# Patient Record
Sex: FEMALE | Race: OTHER | HISPANIC OR LATINO | URBAN - METROPOLITAN AREA
[De-identification: names, ages, dates, MRNs, and addresses within clinical notes are randomized per-mention and may not be internally consistent; named-entity substitution may affect disease eponyms.]

---

## 2017-10-03 ENCOUNTER — EMERGENCY (EMERGENCY)
Facility: HOSPITAL | Age: 62
LOS: 1 days | Discharge: HOME | End: 2017-10-03
Attending: EMERGENCY MEDICINE
Payer: MEDICAID

## 2017-10-03 VITALS
WEIGHT: 115.08 LBS | HEIGHT: 59 IN | DIASTOLIC BLOOD PRESSURE: 94 MMHG | TEMPERATURE: 98 F | HEART RATE: 95 BPM | OXYGEN SATURATION: 99 % | SYSTOLIC BLOOD PRESSURE: 177 MMHG | RESPIRATION RATE: 16 BRPM

## 2017-10-03 VITALS
RESPIRATION RATE: 18 BRPM | OXYGEN SATURATION: 98 % | SYSTOLIC BLOOD PRESSURE: 152 MMHG | DIASTOLIC BLOOD PRESSURE: 84 MMHG | HEART RATE: 90 BPM

## 2017-10-03 PROCEDURE — 99284 EMERGENCY DEPT VISIT MOD MDM: CPT

## 2017-10-03 PROCEDURE — 70450 CT HEAD/BRAIN W/O DYE: CPT | Mod: 26

## 2017-10-03 PROCEDURE — 70450 CT HEAD/BRAIN W/O DYE: CPT

## 2017-10-03 PROCEDURE — 99284 EMERGENCY DEPT VISIT MOD MDM: CPT | Mod: 25

## 2017-10-03 RX ORDER — METOPROLOL TARTRATE 50 MG
25 TABLET ORAL ONCE
Qty: 0 | Refills: 0 | Status: COMPLETED | OUTPATIENT
Start: 2017-10-03 | End: 2017-10-03

## 2017-10-03 NOTE — ED PROVIDER NOTE - MEDICAL DECISION MAKING DETAILS
neuro: CN II - XII intact, EOMI, PERRL, no papilledema, 5/5 muscle strength x 4 extremities, no sensory deficits, 2+ dtr globally, negative babinski, no ataxic gait, normal FERNANDO and FNT, normal romberg   sw consulted   no si or hi no hallucinartons safe to go home   return to ed for intractable HA, persistent vomiting, or new onset motor/sensory deficits do not feel neees shoulder xray based off reevalv

## 2017-10-03 NOTE — ED PROVIDER NOTE - PHYSICAL EXAMINATION
neuro: CN II - XII intact, EOMI, PERRL, no papilledema, 5/5 muscle strength x 4 extremities, no sensory deficits, 2+ dtr globally, negative babinski, no ataxic gait, normal FERNANDO and FNT, normal romberg
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
## 2018-03-27 PROBLEM — I10 ESSENTIAL (PRIMARY) HYPERTENSION: Chronic | Status: ACTIVE | Noted: 2017-10-03

## 2018-04-03 PROBLEM — Z00.00 ENCOUNTER FOR PREVENTIVE HEALTH EXAMINATION: Status: ACTIVE | Noted: 2018-04-03

## 2018-05-03 ENCOUNTER — APPOINTMENT (OUTPATIENT)
Dept: NEUROLOGY | Facility: CLINIC | Age: 63
End: 2018-05-03
Payer: MEDICAID

## 2018-05-03 VITALS
SYSTOLIC BLOOD PRESSURE: 140 MMHG | HEIGHT: 59 IN | DIASTOLIC BLOOD PRESSURE: 80 MMHG | BODY MASS INDEX: 23.39 KG/M2 | WEIGHT: 116 LBS

## 2018-05-03 DIAGNOSIS — Z86.59 PERSONAL HISTORY OF OTHER MENTAL AND BEHAVIORAL DISORDERS: ICD-10-CM

## 2018-05-03 DIAGNOSIS — Z86.79 PERSONAL HISTORY OF OTHER DISEASES OF THE CIRCULATORY SYSTEM: ICD-10-CM

## 2018-05-03 DIAGNOSIS — S09.90XA UNSPECIFIED INJURY OF HEAD, INITIAL ENCOUNTER: ICD-10-CM

## 2018-05-03 DIAGNOSIS — Z86.39 PERSONAL HISTORY OF OTHER ENDOCRINE, NUTRITIONAL AND METABOLIC DISEASE: ICD-10-CM

## 2018-05-03 PROCEDURE — 99204 OFFICE O/P NEW MOD 45 MIN: CPT

## 2018-05-03 RX ORDER — OMEPRAZOLE 20 MG/1
20 CAPSULE, DELAYED RELEASE ORAL
Refills: 0 | Status: ACTIVE | COMMUNITY

## 2018-05-03 RX ORDER — SIMVASTATIN 20 MG/1
20 TABLET, FILM COATED ORAL
Refills: 0 | Status: ACTIVE | COMMUNITY

## 2018-05-03 RX ORDER — METOPROLOL TARTRATE 25 MG/1
25 TABLET, FILM COATED ORAL
Refills: 0 | Status: ACTIVE | COMMUNITY

## 2018-05-03 RX ORDER — DIAZEPAM 10 MG/1
10 TABLET ORAL
Refills: 0 | Status: ACTIVE | COMMUNITY

## 2019-03-04 ENCOUNTER — EMERGENCY (EMERGENCY)
Facility: HOSPITAL | Age: 64
LOS: 1 days | Discharge: HOME | End: 2019-03-04
Attending: STUDENT IN AN ORGANIZED HEALTH CARE EDUCATION/TRAINING PROGRAM
Payer: MEDICAID

## 2019-03-04 VITALS
TEMPERATURE: 98 F | HEART RATE: 122 BPM | DIASTOLIC BLOOD PRESSURE: 79 MMHG | OXYGEN SATURATION: 97 % | RESPIRATION RATE: 16 BRPM | SYSTOLIC BLOOD PRESSURE: 118 MMHG | WEIGHT: 115.08 LBS | HEIGHT: 59 IN

## 2019-03-04 VITALS
HEART RATE: 79 BPM | RESPIRATION RATE: 18 BRPM | SYSTOLIC BLOOD PRESSURE: 147 MMHG | TEMPERATURE: 98 F | OXYGEN SATURATION: 98 % | DIASTOLIC BLOOD PRESSURE: 86 MMHG

## 2019-03-04 PROCEDURE — 99284 EMERGENCY DEPT VISIT MOD MDM: CPT

## 2019-03-04 PROCEDURE — 99284 EMERGENCY DEPT VISIT MOD MDM: CPT | Mod: 25

## 2019-03-04 RX ORDER — ACYCLOVIR SODIUM 500 MG
1 VIAL (EA) INTRAVENOUS
Qty: 35 | Refills: 0 | OUTPATIENT
Start: 2019-03-04 | End: 2019-03-10

## 2019-03-04 RX ORDER — IBUPROFEN 200 MG
1 TABLET ORAL
Qty: 15 | Refills: 0 | OUTPATIENT
Start: 2019-03-04 | End: 2019-03-08

## 2019-03-04 RX ORDER — GABAPENTIN 400 MG/1
1 CAPSULE ORAL
Qty: 21 | Refills: 0 | OUTPATIENT
Start: 2019-03-04 | End: 2019-03-10

## 2019-03-04 RX ORDER — ACYCLOVIR SODIUM 500 MG
800 VIAL (EA) INTRAVENOUS ONCE
Qty: 0 | Refills: 0 | Status: COMPLETED | OUTPATIENT
Start: 2019-03-04 | End: 2019-03-04

## 2019-03-04 RX ORDER — IBUPROFEN 200 MG
600 TABLET ORAL ONCE
Qty: 0 | Refills: 0 | Status: COMPLETED | OUTPATIENT
Start: 2019-03-04 | End: 2019-03-04

## 2019-03-04 RX ORDER — GABAPENTIN 400 MG/1
200 CAPSULE ORAL ONCE
Qty: 0 | Refills: 0 | Status: COMPLETED | OUTPATIENT
Start: 2019-03-04 | End: 2019-03-04

## 2019-03-04 RX ADMIN — Medication 60 MILLIGRAM(S): at 05:04

## 2019-03-04 RX ADMIN — Medication 800 MILLIGRAM(S): at 05:04

## 2019-03-04 RX ADMIN — GABAPENTIN 200 MILLIGRAM(S): 400 CAPSULE ORAL at 05:04

## 2019-03-04 RX ADMIN — Medication 600 MILLIGRAM(S): at 05:04

## 2019-03-04 NOTE — ED ADULT NURSE REASSESSMENT NOTE - NS ED NURSE REASSESS COMMENT FT1
pt moved to waiting room no acute distress  aware of need for medicaid cab, pt aware medicaid cab is delayed

## 2019-03-04 NOTE — ED PROVIDER NOTE - ATTENDING CONTRIBUTION TO CARE
62 yo with papular rash along the left medial/posterior thigh. I personally saw the patient with the PA, and completed the key components of the history and physical exam. I then discussed the management plan with the PA.

## 2019-03-04 NOTE — ED PROVIDER NOTE - CLINICAL SUMMARY MEDICAL DECISION MAKING FREE TEXT BOX
PT with stable VS, no acute distress, non toxic appearing, tolerating PO in the ED, PT with likely herpetic zoster due to classical presentation of rash.  in no acute distress pain controlled pt will be dc home with medications, follow up to PCP, educated about when to return to the ED if needed. PT verbalizes that he understands all instructions and results. Pt educated about the risks vs benefits of imaging at this time and agrees that not warranted for their symptoms, and PE.

## 2019-03-04 NOTE — ED ADULT TRIAGE NOTE - CHIEF COMPLAINT QUOTE
Patient A&Ox4 complaining of vaginal itching, & "bumps" to vaginal area & left thigh x5 days. Patient stated has been using rubbing alcohol & topical hydrocortisone cream.
no concerns

## 2019-03-04 NOTE — ED PROVIDER NOTE - OBJECTIVE STATEMENT
PT with no SPMHx presents to the ED with complaint of pain full itch rash x5 day. PT states that she had spontaneous onset of pain on her Lt leg 5 days ago with mild pain. pt staes that it started with just redness and burning pain that she tried to tx with topical cream that did not help. pt states that she had increased  pain since onset and that the rash has slowly spread up and down her leg. pt denies fever, chills, weakness, exposure to recent illness, back pain, cough, HA, back pain,  N/V, vag bleeding, vag discharge, urinary symptoms, abd pain, abd cramping,

## 2023-01-01 NOTE — ED PROVIDER NOTE - CPE EDP PSYCH NORM
"[facility]  ED Nurse Handoff Report    ED Chief complaint: Facial Swelling      ED Diagnosis:   Final diagnoses:   Erysipelas       Code Status: Full Code    Allergies:   Allergies   Allergen Reactions     Fluoxetine Hives     Pcn [Penicillins] Hives     Prozac [Fluoxetine Hcl] Hives       Patient Story: Patient coming in with new swelling and redness to left side of face. Recently started on oral antibiotics.   Focused Assessment:  Redness and swelling localized to left side of face, patient denies fever, nausea, vomiting.     Treatments and/or interventions provided: IV antibiotics, blood cultures, MRSA swab, NS infussion  Patient's response to treatments and/or interventions: see chart    To be done/followed up on inpatient unit:  IV antibiotics    Does this patient have any cognitive concerns?: None     Activity level - Baseline/Home:  Independent  Activity Level - Current:   Independent    Patient's Preferred language: English   Needed?: No    Isolation: None  Infection: Not Applicable  Patient tested for COVID 19 prior to admission: YES  Bariatric?: No    Vital Signs:   Vitals:    12/31/22 1550 12/31/22 1600 12/31/22 1615   BP: (!) 183/71 (!) 154/91 (!) 164/82   BP Location: Right arm     Pulse: 93 88 90   Resp: 20     Temp: 98  F (36.7  C)     TempSrc: Temporal     SpO2: 98% 99% 98%   Weight:   84 kg (185 lb 3 oz)   Height:   1.651 m (5' 5\")       Cardiac Rhythm: NSR    Was the PSS-3 completed:   Yes  What interventions are required if any?               Family Comments: Family dropping off belongings to patient  OBS brochure/video discussed/provided to patient/family: N/A                For the majority of the shift this patient's behavior was Green.   Behavioral interventions performed were none.    ED NURSE PHONE NUMBER: [phone]         "
Ice pack applied to Lt side of face.   
Pt ambulated independently to the bathroom voided. Gait steady, denied pain or dizziness. VSS.  
normal...
